# Patient Record
Sex: MALE | Race: WHITE
[De-identification: names, ages, dates, MRNs, and addresses within clinical notes are randomized per-mention and may not be internally consistent; named-entity substitution may affect disease eponyms.]

---

## 2018-06-13 ENCOUNTER — HOSPITAL ENCOUNTER (EMERGENCY)
Dept: HOSPITAL 80 - FED | Age: 58
Discharge: HOME | End: 2018-06-13
Payer: COMMERCIAL

## 2018-06-13 VITALS — DIASTOLIC BLOOD PRESSURE: 83 MMHG | SYSTOLIC BLOOD PRESSURE: 128 MMHG

## 2018-06-13 DIAGNOSIS — E86.9: ICD-10-CM

## 2018-06-13 DIAGNOSIS — R10.31: Primary | ICD-10-CM

## 2018-06-13 LAB — PLATELET # BLD: 188 10^3/UL (ref 150–400)

## 2018-06-13 NOTE — EDPHY
H & P


Time Seen by Provider: 06/13/18 14:25


HPI/ROS: 





CHIEF COMPLAINT:  Abdominal pain





HISTORY OF PRESENT ILLNESS:  Patient presents with intermittent right-sided 

abdominal pain over the past week.  Varies from 1 to 2/10 and comes and goes 

and if he pushes hard in his right lower quadrant it is worse.  Not associated 

with vomiting or diarrhea or injury or trauma or dysuria or hematuria or 

testicular symptoms.  No fever or chills.  Pain does not radiate.  He went for 

a 5 mi run today without significant change in his symptoms.  He is concerned 

about appendicitis.





REVIEW OF SYSTEMS:


Eye: no change in vision


ENT: no sore throat


Cardiac: no chest pain or syncope


Pulmonary: no cough or SOB


Abdomen:  HPI


Musculoskeletal: no back pain


Skin: no rash


Neuro: no headache


Constitutional: no fever


: no urinary symptoms





A comprehensive 10 point review of systems is otherwise negative aside from 

elements mentioned in the history of present illness.





PAST MEDICAL HISTORY:  Negative





Social history:  Leaving for a cruise in about 3 days.





General Appearance: Alert and conversant, cooperative.


Eyes: No scleral  icterus. 


ENT, Mouth: Normal mucous membranes.


Respiratory: Normal respiratory effort, breath sounds equal, lungs are clear to 

auscultation.


Cardiovascular:  Regular rate and rhythm.


Gastrointestinal:  Mild right lower quadrant abdominal tenderness without 

rebound or guarding, no hernia, normal male  with normal testicular exam.


Neurological: Alert, face symmetric, normal motor and sensory in extremities. 


Skin: Warm and dry, no rashes.


Musculoskeletal: No peripheral edema.


Psychiatric: Not agitated.





Emergency Department course/MDM:


Continued observation versus imaging discussed with the patient, he would 

prefer CT imaging.


Discussed and consented, i-STAT creatinine 1.1.  Differential includes but not 

limited to diverticulitis, appendicitis, appendiceal abscess, renal colic.





1628: negative CT per Isu[facility].  Results discussed with patient, symptomatic care 

and recheck with primary care doctor if does not resolve.


Smoking Status: Never smoked


Constitutional: 


 Initial Vital Signs











Temperature (C)  36.9 C   06/13/18 13:40


 


Heart Rate  63   06/13/18 13:40


 


Respiratory Rate  16   06/13/18 13:40


 


Blood Pressure  140/79 H  06/13/18 13:40


 


O2 Sat (%)  95   06/13/18 13:40








 











O2 Delivery Mode               Room Air














Allergies/Adverse Reactions: 


 





Penicillins Allergy (Verified 06/13/18 13:43)


 


Sulfa (Sulfonamide Antibiotics) Allergy (Verified 06/13/18 13:42)


 








Home Medications: 














 Medication  Instructions  Recorded


 


NK [No Known Home Meds]  06/13/18














Medical Decision Making





- Diagnostics


Imaging Results: 


 Imaging Impressions





Abdomen CT  06/13/18 14:42


Impression:


1.  Atherosclerotic aorta, without aneurysm.


2.  No CT evidence of appendicitis, abscess, or bowel obstruction. 


3.  No urinary tract obstruction or nephrolithiasis.


 


Findings and recommendations discussed with Emergency Department physician, 

Kevin Wood M.D., at 1622 hours, on June 13, 2018.


 


Final report concurs with initial preliminary interpretation.











Imaging: Discussed imaging studies w/ On call Radiologist


Differential Diagnosis: 





Differential considered including but not limited to hernia, bowel obstruction, 

appendicitis, renal colic.





- Data Points


Laboratory Results: 


 Laboratory Results





 06/13/18 14:30 





 06/13/18 14:30 





 











  06/13/18 06/13/18 06/13/18





  14:57 14:41 14:30


 


WBC      





    


 


RBC      





    


 


Hgb      





    


 


POC Hgb    15.6 gm/dL gm/dL  





    (13.7-17.5)  


 


Hct      





    


 


POC Hct    46 % %  





    (40-51)  


 


MCV      





    


 


MCH      





    


 


MCHC      





    


 


RDW      





    


 


Plt Count      





    


 


MPV      





    


 


Neut % (Auto)      





    


 


Lymph % (Auto)      





    


 


Mono % (Auto)      





    


 


Eos % (Auto)      





    


 


Baso % (Auto)      





    


 


Nucleat RBC Rel Count      





    


 


Absolute Neuts (auto)      





    


 


Absolute Lymphs (auto)      





    


 


Absolute Monos (auto)      





    


 


Absolute Eos (auto)      





    


 


Absolute Basos (auto)      





    


 


Absolute Nucleated RBC      





    


 


Immature Gran %      





    


 


Immature Gran #      





    


 


POC Sodium    144 mEq/L mEq/L  





    (135-145)  


 


Sodium      143 mEq/L mEq/L





     (135-145) 


 


POC Potassium    3.9 mEq/L mEq/L  





    (3.3-5.0)  


 


Potassium      4.4 mEq/L mEq/L





     (3.3-5.0) 


 


POC Chloride    103 mEq/L mEq/L  





    ()  


 


Chloride      104 mEq/L mEq/L





     () 


 


Carbon Dioxide      27 mEq/l mEq/l





     (22-31) 


 


Anion Gap      12 mEq/L mEq/L





     (8-16) 


 


POC BUN    21 mg/dL mg/dL  





    (7-23)  


 


BUN      20 mg/dL mg/dL





     (7-23) 


 


Creatinine      1.0 mg/dL mg/dL





     (0.7-1.3) 


 


POC Creatinine    1.1 mg/dL mg/dL  





    (0.7-1.3)  


 


Estimated GFR      > 60 





    


 


Glucose      81 mg/dL mg/dL





     () 


 


POC Glucose    85 mg/dL mg/dL  





    ()  


 


Calcium      8.9 mg/dL mg/dL





     (8.5-10.4) 


 


Urine Color  GERMAN     





    


 


Urine Appearance  CLEAR     





    


 


Urine pH  6.0     





   (5.0-7.5)   


 


Ur Specific Gravity  1.020     





   (1.002-1.030)   


 


Urine Protein  NEGATIVE     





   (NEGATIVE)   


 


Urine Ketones  NEGATIVE     





   (NEGATIVE)   


 


Urine Blood  NEGATIVE     





   (NEGATIVE)   


 


Urine Nitrate  NEGATIVE     





   (NEGATIVE)   


 


Urine Bilirubin  NEGATIVE     





   (NEGATIVE)   


 


Urine Urobilinogen  NEGATIVE EU EU    





   (0.2-1.0)   


 


Ur Leukocyte Esterase  NEGATIVE     





   (NEGATIVE)   


 


Urine Glucose  NEGATIVE     





   (NEGATIVE)   














  06/13/18





  14:30


 


WBC  6.44 10^3/uL 10^3/uL





   (3.80-9.50) 


 


RBC  4.82 10^6/uL 10^6/uL





   (4.40-6.38) 


 


Hgb  15.1 g/dL g/dL





   (13.7-17.5) 


 


POC Hgb  





  


 


Hct  44.9 % %





   (40.0-51.0) 


 


POC Hct  





  


 


MCV  93.2 fL fL





   (81.5-99.8) 


 


MCH  31.3 pg pg





   (27.9-34.1) 


 


MCHC  33.6 g/dL g/dL





   (32.4-36.7) 


 


RDW  13.2 % %





   (11.5-15.2) 


 


Plt Count  188 10^3/uL 10^3/uL





   (150-400) 


 


MPV  10.9 fL fL





   (8.7-11.7) 


 


Neut % (Auto)  60.9 % %





   (39.3-74.2) 


 


Lymph % (Auto)  28.1 % %





   (15.0-45.0) 


 


Mono % (Auto)  9.6 % %





   (4.5-13.0) 


 


Eos % (Auto)  0.6 % %





   (0.6-7.6) 


 


Baso % (Auto)  0.6 % %





   (0.3-1.7) 


 


Nucleat RBC Rel Count  0.0 % %





   (0.0-0.2) 


 


Absolute Neuts (auto)  3.92 10^3/uL 10^3/uL





   (1.70-6.50) 


 


Absolute Lymphs (auto)  1.81 10^3/uL 10^3/uL





   (1.00-3.00) 


 


Absolute Monos (auto)  0.62 10^3/uL 10^3/uL





   (0.30-0.80) 


 


Absolute Eos (auto)  0.04 10^3/uL 10^3/uL





   (0.03-0.40) 


 


Absolute Basos (auto)  0.04 10^3/uL 10^3/uL





   (0.02-0.10) 


 


Absolute Nucleated RBC  0.00 10^3/uL 10^3/uL





   (0-0.01) 


 


Immature Gran %  0.2 % %





   (0.0-1.1) 


 


Immature Gran #  0.01 10^3/uL 10^3/uL





   (0.00-0.10) 


 


POC Sodium  





  


 


Sodium  





  


 


POC Potassium  





  


 


Potassium  





  


 


POC Chloride  





  


 


Chloride  





  


 


Carbon Dioxide  





  


 


Anion Gap  





  


 


POC BUN  





  


 


BUN  





  


 


Creatinine  





  


 


POC Creatinine  





  


 


Estimated GFR  





  


 


Glucose  





  


 


POC Glucose  





  


 


Calcium  





  


 


Urine Color  





  


 


Urine Appearance  





  


 


Urine pH  





  


 


Ur Specific Gravity  





  


 


Urine Protein  





  


 


Urine Ketones  





  


 


Urine Blood  





  


 


Urine Nitrate  





  


 


Urine Bilirubin  





  


 


Urine Urobilinogen  





  


 


Ur Leukocyte Esterase  





  


 


Urine Glucose  





  











Medications Given: 


 








Discontinued Medications





Sodium Chloride (Ns)  1,000 mls @ 0 mls/hr IV EDNOW ONE; Wide Open


   PRN Reason: Protocol


   Stop: 06/13/18 14:43


   Last Admin: 06/13/18 14:54 Dose:  1,000 mls





Point of Care Test Results: 


 Chemistry











  06/13/18





  14:41


 


POC Sodium  144 mEq/L mEq/L





   (135-145) 


 


POC Potassium  3.9 mEq/L mEq/L





   (3.3-5.0) 


 


POC Chloride  103 mEq/L mEq/L





   () 


 


POC BUN  21 mg/dL mg/dL





   (7-23) 


 


POC Creatinine  1.1 mg/dL mg/dL





   (0.7-1.3) 


 


POC Glucose  85 mg/dL mg/dL





   () 








 ISTAT H&H











  06/13/18





  14:41


 


POC Hgb  15.6 gm/dL gm/dL





   (13.7-17.5) 


 


POC Hct  46 % %





   (40-51) 














Departure





- Departure


Disposition: Home, Routine, Self-Care


Clinical Impression: 


Abdominal pain


Qualifiers:


 Abdominal location: right lower quadrant Qualified Code(s): R10.31 - Right 

lower quadrant pain





Condition: Good


Instructions:  Acute Abdominal Pain (ED)


Referrals: 


Mp Lu MD [Primary Care Provider] - As per Instructions